# Patient Record
Sex: FEMALE | Race: WHITE | Employment: OTHER | URBAN - METROPOLITAN AREA
[De-identification: names, ages, dates, MRNs, and addresses within clinical notes are randomized per-mention and may not be internally consistent; named-entity substitution may affect disease eponyms.]

---

## 2024-05-04 ENCOUNTER — APPOINTMENT (EMERGENCY)
Dept: RADIOLOGY | Facility: HOSPITAL | Age: 54
End: 2024-05-04

## 2024-05-04 ENCOUNTER — HOSPITAL ENCOUNTER (EMERGENCY)
Facility: HOSPITAL | Age: 54
Discharge: HOME | End: 2024-05-04
Attending: EMERGENCY MEDICINE | Admitting: EMERGENCY MEDICINE

## 2024-05-04 VITALS
TEMPERATURE: 97.3 F | DIASTOLIC BLOOD PRESSURE: 90 MMHG | OXYGEN SATURATION: 98 % | HEART RATE: 80 BPM | SYSTOLIC BLOOD PRESSURE: 123 MMHG | RESPIRATION RATE: 18 BRPM

## 2024-05-04 DIAGNOSIS — M25.561 RIGHT KNEE PAIN: Primary | ICD-10-CM

## 2024-05-04 PROCEDURE — 99284 EMERGENCY DEPT VISIT MOD MDM: CPT | Performed by: EMERGENCY MEDICINE

## 2024-05-04 PROCEDURE — 99283 EMERGENCY DEPT VISIT LOW MDM: CPT

## 2024-05-04 PROCEDURE — 73564 X-RAY EXAM KNEE 4 OR MORE: CPT

## 2024-05-04 RX ORDER — NAPROXEN 500 MG/1
500 TABLET ORAL ONCE
Status: COMPLETED | OUTPATIENT
Start: 2024-05-04 | End: 2024-05-04

## 2024-05-04 RX ADMIN — NAPROXEN 500 MG: 500 TABLET ORAL at 12:49

## 2024-05-04 NOTE — ED PROVIDER NOTES
History  Chief Complaint   Patient presents with    Leg Injury     Pt slid down 3 steps injuring her R leg yesterday.  Pt denies injuring anything else or hitting her head     53-year-old woman with no relevant past medical history presents with right knee pain.  Patient was ambulating down stairs into her basement last evening, when she slipped and twisted her right knee sliding down 3 steps.  Patient did not fall or hit her head.  She was ambulatory afterwards.  She took acetaminophen last night for pain.  This morning, she is having worsening right knee pain.  She is able to bear weight.  She is not having any numbness or tingling of the right lower extremity.  She denies any other new pain since the event.    Patient's friend who speaks [place] Island[place] provided interpretation at patient's request.        None       History reviewed. No pertinent past medical history.    History reviewed. No pertinent surgical history.    History reviewed. No pertinent family history.  I have reviewed and agree with the history as documented.    E-Cigarette/Vaping    E-Cigarette Use Never User      E-Cigarette/Vaping Substances     Social History     Tobacco Use    Smoking status: Former     Types: Cigarettes    Smokeless tobacco: Never   Vaping Use    Vaping status: Never Used   Substance Use Topics    Alcohol use: Yes     Comment: social    Drug use: Never        Review of Systems    Physical Exam  ED Triage Vitals [05/04/24 1219]   Temperature Pulse Respirations Blood Pressure SpO2   (!) 97.3 °F (36.3 °C) 80 18 123/90 98 %      Temp Source Heart Rate Source Patient Position - Orthostatic VS BP Location FiO2 (%)   Oral Monitor Sitting Left arm --      Pain Score       8             Orthostatic Vital Signs  Vitals:    05/04/24 1219   BP: 123/90   Pulse: 80   Patient Position - Orthostatic VS: Sitting       Physical Exam  Vitals and nursing note reviewed.   Constitutional:       General: She is not in acute distress.     Appearance:  Normal appearance. She is well-developed.   HENT:      Head: Normocephalic and atraumatic.      Right Ear: External ear normal.      Left Ear: External ear normal.   Cardiovascular:      Rate and Rhythm: Normal rate.   Pulmonary:      Effort: Pulmonary effort is normal. No respiratory distress.   Musculoskeletal:         General: Normal range of motion.      Cervical back: Normal range of motion and neck supple.      Comments: Right knee with generalized tenderness. No effusion or bruising. 2+ PT pulse. RLE neurovascularly intact. Otherwise, full ROM of the bilateral upper and lower extremities without tenderness.   Skin:     General: Skin is warm and dry.   Neurological:      Mental Status: She is alert and oriented to person, place, and time. Mental status is at baseline.   Psychiatric:         Mood and Affect: Mood normal.         Behavior: Behavior normal.         ED Medications  Medications   naproxen (NAPROSYN) tablet 500 mg (500 mg Oral Given 5/4/24 1249)       Diagnostic Studies  Results Reviewed       None                   XR knee 4+ vw right injury   ED Interpretation by Jose Mariscal MD (05/04 6963)   Radiograph personally interpreted by me as no acute osseous abnormality.              Procedures  Procedures      ED Course                             SBIRT 22yo+      Flowsheet Row Most Recent Value   Initial Alcohol Screen: US AUDIT-C     1. How often do you have a drink containing alcohol? 0 Filed at: 05/04/2024 1221   2. How many drinks containing alcohol do you have on a typical day you are drinking?  0 Filed at: 05/04/2024 1221   3a. Male UNDER 65: How often do you have five or more drinks on one occasion? 0 Filed at: 05/04/2024 1221   3b. FEMALE Any Age, or MALE 65+: How often do you have 4 or more drinks on one occassion? 0 Filed at: 05/04/2024 1221   Audit-C Score 0 Filed at: 05/04/2024 1221   NATASHA: How many times in the past year have you...    Used an illegal drug or used a prescription  "medication for non-medical reasons? Never Filed at: 05/04/2024 1221                  Medical Decision Making  Presents with right knee pain after traumatic injury.  Patient able to weight-bear, so low suspicion for fracture.  Radiograph obtained with no bony abnormality.  Recommend patient follow-up with primary care doctor as needed for knee sprain. Patient in agreement with the medical plan and all questions were answered.  The patient verbalized understanding of my return precautions.    Previous ED, hospitalization, and outpatient documentation was reviewed.  History was obtained from the patient.    Portions or all of this note were generated using voice recognition software.  Occasional wrong word or \"sound a like\" substitutions may have occurred due to the inherent limitations of voice recognition software.  Please interpret any errors within the intended context of the whole sentence or idea.      Amount and/or Complexity of Data Reviewed  Radiology: ordered and independent interpretation performed.    Risk  Prescription drug management.          Disposition  Final diagnoses:   Right knee pain     Time reflects when diagnosis was documented in both MDM as applicable and the Disposition within this note       Time User Action Codes Description Comment    5/4/2024  2:03 PM Jose Mariscal Add [M25.561] Right knee pain           ED Disposition       ED Disposition   Discharge    Condition   Stable    Date/Time   Sat May 4, 2024 1403    Comment   Betsy Sher discharge to home/self care.                   Follow-up Information       Follow up With Specialties Details Why Contact Info Additional Information    [facility]   [address] [place] [id number] [phone] [facility], [address] [place] [address], [id number] [phone]            Patient's Medications "    No medications on file     No discharge procedures on file.    PDMP Review       None             ED Provider  Attending physically available and evaluated Betsy Sher. I managed the patient along with the ED Attending.    Electronically Signed by           Jose Mariscal MD  05/04/24 4475

## 2024-05-04 NOTE — ED ATTENDING ATTESTATION
5/4/2024  I, Gabe Kraus DO, saw and evaluated the patient. I have discussed the patient with the resident/non-physician practitioner and agree with the resident's/non-physician practitioner's findings, Plan of Care, and MDM as documented in the resident's/non-physician practitioner's note, except where noted. All available labs and Radiology studies were reviewed.  I was present for key portions of any procedure(s) performed by the resident/non-physician practitioner and I was immediately available to provide assistance.       At this point I agree with the current assessment done in the Emergency Department.  I have conducted an independent evaluation of this patient a history and physical is as follows:    Patient is a 53-year-old female, primary dominantly [place]ian [place]ander speaking, comedy by her bilingual friend, indicates she preferred to have the friend as a  rather than using a  service.  Yesterday she slid down 3 steps twisting her right leg.  She did not hit her head or pass out, since then having some mild pain in her right leg.  She took over-the-counter analgesia earlier which gave her good relief, does not need pain medication currently.  She has no numbness, no tingling, no previous surgeries or injuries to that leg or knee.  Also says that she had a faint bilateral rash on her bilateral lower legs for over a year, is not itchy, does not bother her, not changed, no medications that she takes daily, no new soaps, clothing or detergents.  No trouble breathing.    Past medical history: Denies  Medications: Denies    General:  Patient is well-appearing  Head:  Atraumatic  Eyes:  Conjunctiva pink  ENT:  Mucous membranes are moist  Neck:  Supple  Cardiac:  S1-S2, without murmurs  Lungs:  Clear to auscultation bilaterally  Abdomen:  Soft, nontender, normal bowel sounds, no CVA tenderness, no tympany, no rigidity, no guarding  Extremities: Patient has some mild  tenderness around the medial joint line at the right knee, slight discomfort with passive range of motion of the right knee but there is no limitation to passive range of motion.  There is no ligament laxity or instability, negative lever test, negative valgus varus stress test.  Patella tracks well.  She has no other bony tenderness of the right leg including no hip, femur, tibia, fibula, foot or ankle tenderness otherwise.  She can plantarflex and dorsiflex at the ankle and toes without difficulty.  The other 3 extremities are atraumatic, nontender with normal, painless range of motion.  She does have a very faint bilateral petechial rash on her lower legs from the mid shins down.  There is no sloughing of the skin, there is no calf asymmetry,  Neurologic:  Awake, fluent speech, normal comprehension. AAOx3.   Skin:  Pink warm and dry, slight rash as noted above, no other rash on the rest of the body  Psychiatric:  Alert, pleasant, cooperative      ED Course       Right knee x-ray interpreted by me shows no acute fracture, no acute osseous abnormality      Patient's rash is nonspecific, has been there for 1 year, unchanged, no associated symptoms otherwise, does not have the appearance of contact dermatitis, no sign of cellulitis or significant infection.  Given the chronicity of the rash I do not believe that she requires laboratory studies or additional testing for this.    Supportive care, importance of follow-up and return precautions were discussed with the patient, who expressed understanding.    DIAGNOSIS:  Acute fall, acute right leg contusion    MEDICAL DECISION MAKING CODING    COLLECTION AND INTERPRETATION OF DATA  Additional history obtained from: Female friend    I ordered each unique test  Tests reviewed personally by me:  Imaging: I independently reviewed the right knee x-ray as noted above.      Tests considered but not ordered: See above    RISK  Drugs (OTC, Rx, Controlled substances):  OTC      Surgery  -I considered surgery may be necessary prior to completion of the work up but afterwards there is no indication for immediate surgery    Social Determinants of Health:  Presentation to ED outside of business hours or on night shift        Critical Care Time  Procedures

## 2024-05-04 NOTE — DISCHARGE INSTRUCTIONS
Você foi atendido com lokesh no joelho direito. Não marc encontrados ferimentos emergentes. Você pode kristopher 975 mg de paracetamol ([place] comercial inclui Tylenol) e 400 mg de ibuprofeno (nomes comerciais incluem Advil ou Motrin) a cada 6 horas para lokesh / febre. Acompanhe com seu médico de atenção primária, se necessário, para reavaliação.    You were seen for right knee pain. We found no emergent injuries you sustained. You can take 975 mg acetaminophen (brand name includes Tylenol) and 400 mg ibuprofen (brand names include Advil or Motrin) every 6 hours for pain/fever. Follow up with your primary care doctor if needed for re-evaluation.